# Patient Record
Sex: FEMALE | Race: WHITE | NOT HISPANIC OR LATINO | URBAN - METROPOLITAN AREA
[De-identification: names, ages, dates, MRNs, and addresses within clinical notes are randomized per-mention and may not be internally consistent; named-entity substitution may affect disease eponyms.]

---

## 2023-05-15 ENCOUNTER — TELEPHONE ENCOUNTER (OUTPATIENT)
Dept: URBAN - METROPOLITAN AREA CLINIC 96 | Facility: CLINIC | Age: 63
End: 2023-05-15

## 2023-06-06 ENCOUNTER — OFFICE VISIT (OUTPATIENT)
Dept: URBAN - METROPOLITAN AREA CLINIC 96 | Facility: CLINIC | Age: 63
End: 2023-06-06

## 2023-06-06 ENCOUNTER — DASHBOARD ENCOUNTERS (OUTPATIENT)
Age: 63
End: 2023-06-06

## 2023-06-06 RX ORDER — OMEPRAZOLE 40 MG/1
TAKE 1 CAPSULE (40 MG) BY ORAL ROUTE ONCE DAILY BEFORE A MEAL FOR 30 DAYS CAPSULE, DELAYED RELEASE PELLETS ORAL
Qty: 30 | Refills: 6 | Status: ACTIVE | COMMUNITY
Start: 2018-09-06

## 2023-06-06 RX ORDER — METHOCARBAMOL 750 MG/1
TABLET, FILM COATED ORAL
Qty: 0 | Refills: 0 | Status: ACTIVE | COMMUNITY
Start: 1900-01-01

## 2023-06-06 RX ORDER — LOSARTAN POTASSIUM 50 MG/1
TABLET ORAL
Qty: 0 | Refills: 0 | Status: ACTIVE | COMMUNITY
Start: 1900-01-01

## 2023-06-06 RX ORDER — PROMETHAZINE HYDROCHLORIDE 25 MG/1
TABLET ORAL
Qty: 0 | Refills: 0 | Status: ACTIVE | COMMUNITY
Start: 1900-01-01

## 2023-06-06 RX ORDER — ACETAMINOPHEN 325 MG/1
TABLET, FILM COATED ORAL
Qty: 0 | Refills: 0 | Status: ACTIVE | COMMUNITY
Start: 1900-01-01

## 2023-06-06 NOTE — HPI-TODAY'S VISIT:
Patient is a 62-year-old female who presents to discuss GI complaints. She has a PMH of MVP, DM, gastric bypass abbie en y (2003) and umbilical hernia.   She has a history of Abbie-en-Y gastric bypass multiple other surgeries including cholecystectomy and hernia repair.  She presented to [facility] for vomiting and diarrhea. There was also cardiac concerns as she was having worsening shortness of breath with walking across a room. HR reading at home between 150-200s. CT scan was completed. Negtive for strangulation or incarceration of hernia. Lobulated fat contianing ventral hernia within midline of the anterior pelvic wall measuring 3.1 x 5.2 cm. small hiatal hernia.  Last upper endoscopy completed 3/20/2018 with Dr. Castillo.  Findings included medium size hiatal hernia.  Gastric bypass with a small sized pouch.  Gastrojejunal anastomosis characterized by healthy-appearing mucosa.  Normal-appearing mucosa in the jejunum.

## 2023-07-12 ENCOUNTER — OFFICE VISIT (OUTPATIENT)
Dept: URBAN - METROPOLITAN AREA CLINIC 96 | Facility: CLINIC | Age: 63
End: 2023-07-12

## 2024-07-29 ENCOUNTER — OFFICE VISIT (OUTPATIENT)
Dept: URBAN - METROPOLITAN AREA TELEHEALTH 2 | Facility: TELEHEALTH | Age: 64
End: 2024-07-29
Payer: COMMERCIAL

## 2024-07-29 ENCOUNTER — LAB OUTSIDE AN ENCOUNTER (OUTPATIENT)
Dept: URBAN - METROPOLITAN AREA TELEHEALTH 2 | Facility: TELEHEALTH | Age: 64
End: 2024-07-29

## 2024-07-29 VITALS — HEIGHT: 61 IN | BODY MASS INDEX: 34.36 KG/M2 | WEIGHT: 182 LBS

## 2024-07-29 DIAGNOSIS — K21.9 GASTROESOPHAGEAL REFLUX DISEASE, UNSPECIFIED WHETHER ESOPHAGITIS PRESENT: ICD-10-CM

## 2024-07-29 DIAGNOSIS — Z12.11 COLON CANCER SCREENING: ICD-10-CM

## 2024-07-29 DIAGNOSIS — D50.8 OTHER IRON DEFICIENCY ANEMIA: ICD-10-CM

## 2024-07-29 DIAGNOSIS — K59.04 CHRONIC IDIOPATHIC CONSTIPATION: ICD-10-CM

## 2024-07-29 PROBLEM — 931000119107: Status: ACTIVE | Noted: 2024-07-29

## 2024-07-29 PROBLEM — 235595009: Status: ACTIVE | Noted: 2024-07-29

## 2024-07-29 PROBLEM — 82934008: Status: ACTIVE | Noted: 2024-07-29

## 2024-07-29 PROBLEM — 87522002: Status: ACTIVE | Noted: 2024-07-29

## 2024-07-29 PROBLEM — 707444001: Status: ACTIVE | Noted: 2024-07-29

## 2024-07-29 PROCEDURE — 99204 OFFICE O/P NEW MOD 45 MIN: CPT | Performed by: INTERNAL MEDICINE

## 2024-07-29 RX ORDER — LUBIPROSTONE 24 UG/1
1 CAPSULE WITH FOOD AND WATER CAPSULE, GELATIN COATED ORAL
Qty: 180 CAPSULES | Refills: 3 | OUTPATIENT
Start: 2024-07-29 | End: 2025-07-24

## 2024-07-29 RX ORDER — LOSARTAN POTASSIUM 50 MG/1
TABLET ORAL
Qty: 0 | Refills: 0 | COMMUNITY
Start: 1900-01-01

## 2024-07-29 RX ORDER — PROMETHAZINE HYDROCHLORIDE 25 MG/1
TABLET ORAL
Qty: 0 | Refills: 0 | COMMUNITY
Start: 1900-01-01

## 2024-07-29 RX ORDER — ACETAMINOPHEN 325 MG/1
TABLET, FILM COATED ORAL
Qty: 0 | Refills: 0 | COMMUNITY
Start: 1900-01-01

## 2024-07-29 RX ORDER — OMEPRAZOLE 40 MG/1
TAKE 1 CAPSULE (40 MG) BY ORAL ROUTE ONCE DAILY BEFORE A MEAL FOR 30 DAYS CAPSULE, DELAYED RELEASE PELLETS ORAL
Qty: 30 | Refills: 6 | COMMUNITY
Start: 2018-09-06

## 2024-07-29 RX ORDER — METHOCARBAMOL 750 MG/1
TABLET, FILM COATED ORAL
Qty: 0 | Refills: 0 | COMMUNITY
Start: 1900-01-01

## 2024-07-29 NOTE — HPI-TODAY'S VISIT:
Patient is a 64-year-old female referred by Dr. Vincent Doss for history of NSAID use and anemia.  Upon review of her procedures the last ones were done by Dr. Castillo in March 2018.  She had an EGD done for abdominal pain that showed a medium size hiatal hernia and a small gastric pouch from her gastrojejunal surgery.  Biopsies were sent.  Pathology showed no evidence of celiac, mild chronic inflammation of the stomach but no H. pylori GE junction biopsy showed some mild chronic inflammation and mid esophageal biopsy was normal.  I do see a barium swallow done as well in 2018 that showed that a barium tablet passed through without impedance and there was a moderate size hiatal hernia with spontaneous reflux seen. Pt sees Dr Mendes and was ref to Dr Doss for anemia. Over the last year she has lost 100lbs she says not trying to lose weight. Pt has "no idea" when she had a colonoscopy done. Pt seeing Dr Lamine Dela Cruz to fix his abdominal hernias. Pt is considering a fundoplication. Has regurgiation on PPIs. Pt c/o constipation and goes every 3 days. Has tried and failed  Pt is on oxygen when she sleeps at night. No cardiac disease. Seeing Dr Mendes soon. Pt has some appetite.

## 2024-07-31 ENCOUNTER — TELEPHONE ENCOUNTER (OUTPATIENT)
Dept: URBAN - METROPOLITAN AREA CLINIC 92 | Facility: CLINIC | Age: 64
End: 2024-07-31

## 2024-07-31 ENCOUNTER — TELEPHONE ENCOUNTER (OUTPATIENT)
Dept: URBAN - METROPOLITAN AREA CLINIC 96 | Facility: CLINIC | Age: 64
End: 2024-07-31

## 2024-08-06 ENCOUNTER — TELEPHONE ENCOUNTER (OUTPATIENT)
Dept: URBAN - METROPOLITAN AREA CLINIC 96 | Facility: CLINIC | Age: 64
End: 2024-08-06

## 2025-07-24 ENCOUNTER — LAB OUTSIDE AN ENCOUNTER (OUTPATIENT)
Dept: URBAN - METROPOLITAN AREA TELEHEALTH 2 | Facility: TELEHEALTH | Age: 65
End: 2025-07-24

## 2025-07-25 ENCOUNTER — OFFICE VISIT (OUTPATIENT)
Dept: URBAN - METROPOLITAN AREA TELEHEALTH 2 | Facility: TELEHEALTH | Age: 65
End: 2025-07-25
Payer: COMMERCIAL

## 2025-07-25 DIAGNOSIS — K44.9 HIATAL HERNIA: ICD-10-CM

## 2025-07-25 DIAGNOSIS — R63.4 WEIGHT LOSS: ICD-10-CM

## 2025-07-25 DIAGNOSIS — Z98.84 HISTORY OF ROUX-EN-Y GASTRIC BYPASS: ICD-10-CM

## 2025-07-25 DIAGNOSIS — Z12.11 COLON CANCER SCREENING: ICD-10-CM

## 2025-07-25 DIAGNOSIS — K21.9 GASTROESOPHAGEAL REFLUX DISEASE, UNSPECIFIED WHETHER ESOPHAGITIS PRESENT: ICD-10-CM

## 2025-07-25 DIAGNOSIS — J45.909 ASTHMA, UNSPECIFIED ASTHMA SEVERITY, UNSPECIFIED WHETHER COMPLICATED, UNSPECIFIED WHETHER PERSISTENT: ICD-10-CM

## 2025-07-25 DIAGNOSIS — D50.9 IRON DEFICIENCY ANEMIA, UNSPECIFIED IRON DEFICIENCY ANEMIA TYPE: ICD-10-CM

## 2025-07-25 DIAGNOSIS — J45.909 UNCOMPLICATED ASTHMA, UNSPECIFIED ASTHMA SEVERITY, UNSPECIFIED WHETHER PERSISTENT: ICD-10-CM

## 2025-07-25 DIAGNOSIS — K59.04 CHRONIC IDIOPATHIC CONSTIPATION: ICD-10-CM

## 2025-07-25 DIAGNOSIS — Z99.81 OXYGEN DEPENDENT: ICD-10-CM

## 2025-07-25 PROCEDURE — 99204 OFFICE O/P NEW MOD 45 MIN: CPT | Performed by: INTERNAL MEDICINE

## 2025-07-25 RX ORDER — MORPHINE SULFATE 15 MG/1
1 TABLET AS NEEDED TABLET ORAL
Status: ACTIVE | COMMUNITY

## 2025-07-25 RX ORDER — OMEPRAZOLE 40 MG/1
TAKE 1 CAPSULE (40 MG) BY ORAL ROUTE ONCE DAILY BEFORE A MEAL FOR 30 DAYS CAPSULE, DELAYED RELEASE PELLETS ORAL
Qty: 30 | Refills: 6 | COMMUNITY
Start: 2018-09-06

## 2025-07-25 RX ORDER — ACETAMINOPHEN 325 MG/1
TABLET, FILM COATED ORAL
Qty: 0 | Refills: 0 | Status: ON HOLD | COMMUNITY
Start: 1900-01-01

## 2025-07-25 RX ORDER — LUBIPROSTONE 24 UG/1
1 CAPSULE WITH FOOD AND WATER CAPSULE, GELATIN COATED ORAL TWICE A DAY
Status: ACTIVE | COMMUNITY
Start: 2025-07-24

## 2025-07-25 RX ORDER — METHOCARBAMOL 750 MG/1
TABLET, FILM COATED ORAL
Qty: 0 | Refills: 0 | COMMUNITY
Start: 1900-01-01

## 2025-07-25 RX ORDER — PROMETHAZINE HYDROCHLORIDE 25 MG/1
TABLET ORAL
Qty: 0 | Refills: 0 | Status: ACTIVE | COMMUNITY
Start: 1900-01-01

## 2025-07-25 RX ORDER — LUBIPROSTONE 24 UG/1
1 CAPSULE WITH FOOD AND WATER CAPSULE, GELATIN COATED ORAL TWICE A DAY
Qty: 180 CAPSULE | Refills: 3 | OUTPATIENT
Start: 2025-07-25 | End: 2026-07-20

## 2025-07-25 RX ORDER — BUPROPION HYDROCHLORIDE 150 MG/1
1 TABLET IN THE MORNING TABLET, EXTENDED RELEASE ORAL ONCE A DAY
Status: ACTIVE | COMMUNITY

## 2025-07-25 RX ORDER — TIZANIDINE 4 MG/1
1 TABLET AT BEDTIME AS NEEDED TABLET ORAL ONCE A DAY
Status: ACTIVE | COMMUNITY

## 2025-07-25 RX ORDER — LUBIPROSTONE 24 UG/1
1 CAPSULE WITH FOOD AND WATER CAPSULE, GELATIN COATED ORAL TWICE A DAY
Qty: 14 CAPSULE | Refills: 3 | OUTPATIENT
Start: 2025-07-25 | End: 2025-08-22

## 2025-07-25 RX ORDER — LOSARTAN POTASSIUM 50 MG/1
TABLET ORAL
Qty: 0 | Refills: 0 | Status: ACTIVE | COMMUNITY
Start: 1900-01-01

## 2025-07-25 RX ORDER — LISINOPRIL 10 MG/1
1 TABLET TABLET ORAL ONCE A DAY
Status: ACTIVE | COMMUNITY

## 2025-07-25 RX ORDER — DAPAGLIFLOZIN 10 MG/1
1 TABLET TABLET, FILM COATED ORAL ONCE A DAY
Status: ACTIVE | COMMUNITY

## 2025-07-25 NOTE — HPI-TODAY'S VISIT:
Upon review of her procedures the last ones were done by Dr. Castillo in March 2018.  She had an EGD done for abdominal pain that showed a medium size hiatal hernia and a small gastric pouch from her gastrojejunal surgery.  Biopsies were sent.  Pathology showed no evidence of celiac, mild chronic inflammation of the stomach but no H. pylori GE junction biopsy showed some mild chronic inflammation and mid esophageal biopsy was normal.  I do see a barium swallow done as well in 2018 that showed that a barium tablet passed through without impedance and there was a moderate size hiatal hernia with spontaneous reflux seen.  Pt sees Dr Mendes and was ref to Dr Doss for anemia. Over the last year she has lost 100lbs she says not trying to lose weight. Pt has "no idea" when she had a colonoscopy done. Pt seeing Dr Lamine Dela Cruz to fix his abdominal hernias. Pt is considering a fundoplication. Has regurgiation on PPIs. Pt c/o constipation and goes every 3 days. Has tried and failed  Pt is on oxygen when she sleeps at night. No cardiac disease. Seeing Dr Mendes soon. Pt has some appetite.  Todays visit- Pt was seen in 2024. I had ord egd and colon to w/u anemia and wt loss. I ord lubiprostone 24ug bid for her CIC and rec a 2 day constip prep. It does not appear the procedures were done. I did ask for pulm clearance. Neetu, a 65-year-old woman with a history of Abbie-en-Y gastric bypass, presented for follow-up of iron deficiency anemia and chronic constipation, as well as to address her overdue colon cancer screening. She reported that she has never had a colonoscopy and last underwent endoscopy in 2018. She denied any episodes of bloody or black stools. She described ongoing anemia requiring hematology evaluation and iron infusions. Her chronic constipation is compounded by daily morphine use, resulting in bowel movements only once a week. Pt was Rx lubiprostone at last visit but does not recall if she started it. Neetu also has a history of asthma and uses oxygen at night, requiring clearance from her asthma doctor before any procedures can be performed.

## 2025-07-28 ENCOUNTER — TELEPHONE ENCOUNTER (OUTPATIENT)
Dept: URBAN - METROPOLITAN AREA CLINIC 92 | Facility: CLINIC | Age: 65
End: 2025-07-28

## 2025-07-28 ENCOUNTER — TELEPHONE ENCOUNTER (OUTPATIENT)
Dept: URBAN - METROPOLITAN AREA SURGERY CENTER 18 | Facility: SURGERY CENTER | Age: 65
End: 2025-07-28

## 2025-07-29 ENCOUNTER — TELEPHONE ENCOUNTER (OUTPATIENT)
Dept: URBAN - METROPOLITAN AREA CLINIC 92 | Facility: CLINIC | Age: 65
End: 2025-07-29

## 2025-07-30 ENCOUNTER — TELEPHONE ENCOUNTER (OUTPATIENT)
Dept: URBAN - METROPOLITAN AREA CLINIC 92 | Facility: CLINIC | Age: 65
End: 2025-07-30

## 2025-07-30 ENCOUNTER — OFFICE VISIT (OUTPATIENT)
Dept: URBAN - METROPOLITAN AREA CLINIC 92 | Facility: CLINIC | Age: 65
End: 2025-07-30
Payer: COMMERCIAL

## 2025-07-30 DIAGNOSIS — D50.9 IRON DEFICIENCY ANEMIA, UNSPECIFIED IRON DEFICIENCY ANEMIA TYPE: ICD-10-CM

## 2025-07-30 DIAGNOSIS — Z98.84 HISTORY OF ROUX-EN-Y GASTRIC BYPASS: ICD-10-CM

## 2025-07-30 DIAGNOSIS — K44.9 HIATAL HERNIA: ICD-10-CM

## 2025-07-30 DIAGNOSIS — Z99.81 OXYGEN DEPENDENT: ICD-10-CM

## 2025-07-30 DIAGNOSIS — K21.9 GASTROESOPHAGEAL REFLUX DISEASE, UNSPECIFIED WHETHER ESOPHAGITIS PRESENT: ICD-10-CM

## 2025-07-30 DIAGNOSIS — J45.909 UNCOMPLICATED ASTHMA, UNSPECIFIED ASTHMA SEVERITY, UNSPECIFIED WHETHER PERSISTENT: ICD-10-CM

## 2025-07-30 DIAGNOSIS — Z90.49 S/P CHOLECYSTECTOMY: ICD-10-CM

## 2025-07-30 DIAGNOSIS — K83.8 COMMON BILE DUCT DILATATION: ICD-10-CM

## 2025-07-30 DIAGNOSIS — Z12.11 COLON CANCER SCREENING: ICD-10-CM

## 2025-07-30 DIAGNOSIS — K59.04 CHRONIC IDIOPATHIC CONSTIPATION: ICD-10-CM

## 2025-07-30 DIAGNOSIS — R63.4 WEIGHT LOSS: ICD-10-CM

## 2025-07-30 PROCEDURE — 99215 OFFICE O/P EST HI 40 MIN: CPT

## 2025-07-30 RX ORDER — LUBIPROSTONE 24 UG/1
1 CAPSULE WITH FOOD AND WATER CAPSULE, GELATIN COATED ORAL TWICE A DAY
Qty: 180 CAPSULE | Refills: 3 | OUTPATIENT
Start: 2025-07-30 | End: 2026-07-25

## 2025-07-30 RX ORDER — DAPAGLIFLOZIN 10 MG/1
1 TABLET TABLET, FILM COATED ORAL ONCE A DAY
Status: ACTIVE | COMMUNITY

## 2025-07-30 RX ORDER — PROMETHAZINE HYDROCHLORIDE 25 MG/1
TABLET ORAL
Qty: 0 | Refills: 0 | Status: ACTIVE | COMMUNITY
Start: 1900-01-01

## 2025-07-30 RX ORDER — LOSARTAN POTASSIUM 50 MG/1
TABLET ORAL
Qty: 0 | Refills: 0 | Status: ACTIVE | COMMUNITY
Start: 1900-01-01

## 2025-07-30 RX ORDER — LUBIPROSTONE 24 UG/1
1 CAPSULE WITH FOOD AND WATER CAPSULE, GELATIN COATED ORAL TWICE A DAY
Status: ACTIVE | COMMUNITY
Start: 2025-07-24

## 2025-07-30 RX ORDER — BUPROPION HYDROCHLORIDE 300 MG/1
1 TABLET IN THE MORNING TABLET, EXTENDED RELEASE ORAL ONCE A DAY
Status: ACTIVE | COMMUNITY

## 2025-07-30 RX ORDER — BUPROPION HYDROCHLORIDE 150 MG/1
1 TABLET IN THE MORNING TABLET, EXTENDED RELEASE ORAL ONCE A DAY
Status: ACTIVE | COMMUNITY

## 2025-07-30 RX ORDER — OMEPRAZOLE 40 MG/1
1 CAPSULE 30 MINUTES BEFORE MEAL CAPSULE, DELAYED RELEASE ORAL TWICE DAILY
Qty: 180 | Refills: 3 | OUTPATIENT
Start: 2025-07-30

## 2025-07-30 RX ORDER — TIZANIDINE 4 MG/1
1 TABLET AT BEDTIME AS NEEDED TABLET ORAL ONCE A DAY
Status: ACTIVE | COMMUNITY

## 2025-07-30 RX ORDER — LISINOPRIL 10 MG/1
1 TABLET TABLET ORAL ONCE A DAY
Status: ACTIVE | COMMUNITY

## 2025-07-30 RX ORDER — MORPHINE SULFATE 15 MG/1
1 TABLET AS NEEDED TABLET ORAL
Status: ACTIVE | COMMUNITY

## 2025-07-30 NOTE — HPI-TODAY'S VISIT:
65-year-old female patient presents today for a follow-up visit.  She was just seen by Dr. LAWRENCE on 7/25/25.  She had previously been seen about 1 year ago.  During her initial visit in 2024 patient noted she was being seen for anemia and noted that she had lost about 100 pounds without trying in over the last year.  She was not aware when her last colonoscopy was done at that time.  She also noted history of constipation every 3 days. Dr. LAWRENCE did order a endoscopy and colonoscopy however this was not done. Her last endoscopy was in March 2018 which demonstrated a medium size hiatal hernia, small gastric pouch from her gastrojejunal surgery biopsy showed no evidence of celiac, mild to chronic inflammation of the stomach no HP or IM. Patient also noted chronic constipation that is compounded by daily morphine use.  Has bowel movements once a week.  She was sent lubiprostone 24 mcg twice daily after last visit. It was recommended again to have a colonoscopy and endoscopy and a pulmonology clearance was sent since patient is on nighttime oxygen.  She is seeing Dr. Phil Mendes. Patient comes in for an acute visit today.  She visited [facility] emergency room yesterday due to right upper quadrant abdominal pain x 6 months felt like her symptoms were worsening over time.  There she had a CT A/P with IV contrast which demonstrated constipation, appendix not definitively seen but no pericecal inflammatory change to suggest acute appendicitis, small hiatal hernia, stable dilated common bile duct could be related to postcholecystectomy state correlate with LFTs and recommend MRI/MRCP, stable fat/omental containing left sided ventral hernia.  Chest x-ray was negative.  Labs showed normal lactate, hemoglobin 12.3, hematocrit 38.8, MCV 89.6.  Normal liver enzymes, lipase, troponin.  Patient was given Dilaudid and Zofran while inpatient.  She was prescribed Bentyl 20 mg, Colace, simethicone and MiraLAX after her discharge.   Today she notes she has had ruq abd pain for 6 months.Usually has pain for 2 hours, yesterday had it for much longer. Has nausea and vomiting with it as well. Feels like a stabbing pain. Pain went into the right side of the chest. Her legs also go weak. Had a CCY several years ago. Feels like the pain is worse then it was when she had gallstones. Also has occ LLQ abd pain this can also be severe pain and has n/v.  Has occ BRB on TP when she strains. Thinks she has seen blood within the stool but not sure. Has been on amitiza 24mcg BID and this has helped some with her BM.  Has had a lot of stress lately. Daughter is going to have brain surgery soon. She was tearful several times t/o our visit today.

## 2025-07-31 ENCOUNTER — TELEPHONE ENCOUNTER (OUTPATIENT)
Dept: URBAN - METROPOLITAN AREA CLINIC 92 | Facility: CLINIC | Age: 65
End: 2025-07-31

## 2025-08-05 ENCOUNTER — TELEPHONE ENCOUNTER (OUTPATIENT)
Dept: URBAN - METROPOLITAN AREA CLINIC 96 | Facility: CLINIC | Age: 65
End: 2025-08-05